# Patient Record
(demographics unavailable — no encounter records)

---

## 2025-05-27 NOTE — HISTORY OF PRESENT ILLNESS
[FreeTextEntry1] : This is a 44-year-old female, LMP: 5/13/2025, presenting for acute GYN visit. She is accompanied by her daughter (daughter is CNA @ [employer] and in RN school).   Her chief concern is vaginal irritation and white discharge x 1-2 months. She presented to urgent care twice, once in March and once in April and was prescribed antibiotics for a UTI both times. She is unsure of the name of the antibiotics. She endorses persistent frequent urination, but states urination is not as painful as it was in April.   Patient also endorses missed period x 3 months which has since returned.   She denies vaginal odor.   OB History: G3, C/S x2 GYN History: Endometriosis Medical History: Denies  Surgical History:  c/s, hyst D&C, Laparoscopic excision of endometriosis, Bilateral salpingectomy, Left partial oophorectomy, Cystoscopy (8/21/23 with Dr. MARCOS Barlow).

## 2025-05-27 NOTE — PLAN
[FreeTextEntry1] : This is a 44-year-old female, LMP: 5/13/2025, presenting for acute GYN visit with chief concern of vaginal irritation and white discharge x 1-2 months.   Urinary Frequency: - Urine Culture collected today; will treat based on results.  - Advised patient to start probiotic QD.   Vaginal Irritation: - Vaginitis Panel Culture collected today; will treat based on results. - RX for Clotrimazole-Betamethasone 1-0.05 % External Cream sent to [facility] on file.   Perimenopause: - Patient to track menses. - Advised patient increased  infections/symptoms as well as acne could be contributing factors to hormonal changes.  - Will discuss further if persisting at next visit.   Patient to follow up with Dr. Barlow for annual as scheduled 7/22/2025. Patient to call with further questions and concerns. Patient verbalizes understanding of care plan and is agreeable.

## 2025-07-22 NOTE — PLAN
[FreeTextEntry1] :  44 year old female presents for annual.  -Pap/HPV -Mammo rx -Pelvic sono rx -Advised colonoscopy -known endo F/u after tobi Barlow MD

## 2025-07-22 NOTE — HISTORY OF PRESENT ILLNESS
[FreeTextEntry1] :  44 year old female presents for annual. Reports recent irregular period. Has endometriosis. Notes estrogen was high with PCP. Has not had a mammo this year. C/o occasional back pain and cramping during periods. Otherwise doing well. states no complaints currently needs mammogram known endo

## 2025-07-22 NOTE — END OF VISIT
[FreeTextEntry3] : I, Sharonda Segovia, acted as a scribe on behalf of Dr. Maggie Barlow M.D. on 07/22/2025.   All medical entries made by the scribe were at my, Dr. Maggie Barlow M.D., direction and personally dictated by me on 07/22/2025. I have reviewed the chart and agree that the record accurately reflects my personal performance of the history, physical exam, assessment and plan. I have also personally directed, reviewed, and agreed with the chart.